# Patient Record
Sex: MALE | Race: AMERICAN INDIAN OR ALASKA NATIVE
[De-identification: names, ages, dates, MRNs, and addresses within clinical notes are randomized per-mention and may not be internally consistent; named-entity substitution may affect disease eponyms.]

---

## 2018-08-06 ENCOUNTER — HOSPITAL ENCOUNTER (EMERGENCY)
Dept: HOSPITAL 5 - ED | Age: 23
Discharge: HOME | End: 2018-08-06
Payer: SELF-PAY

## 2018-08-06 VITALS — SYSTOLIC BLOOD PRESSURE: 150 MMHG | DIASTOLIC BLOOD PRESSURE: 81 MMHG

## 2018-08-06 DIAGNOSIS — Z20.2: Primary | ICD-10-CM

## 2018-08-06 DIAGNOSIS — F12.10: ICD-10-CM

## 2018-08-06 PROCEDURE — 96372 THER/PROPH/DIAG INJ SC/IM: CPT

## 2018-08-06 PROCEDURE — 99282 EMERGENCY DEPT VISIT SF MDM: CPT

## 2018-08-06 NOTE — EMERGENCY DEPARTMENT REPORT
ED Male  HPI





- General


Chief complaint: Urogenital-Male


Stated complaint: MILD HEAD ACHE


Time Seen by Provider: 08/06/18 10:46


Source: patient


Mode of arrival: Ambulatory


Limitations: No Limitations





- History of Present Illness


Initial comments: 





This is a 23-year-old -American male that presents with dysuria and 

penile discharge for 3 days.  Patient states he is having painful urination 

with yellowish discharge.  Denies pelvic and low back pain, testicular pain or 

swelling, dysuria, frequency, and urgency.  Admits to recent exposure to STDs.  

Patient states he just started a new job and insurance has not started.


MD Complaint: penile discharge, dysuria


Onset/Timing: 3


-: days(s)


Location: penis


Radiation: none


Severity: moderate


Severity scale (0 -10): 2


Quality: burning


Consistency: intermittent


Improves with: none


Worsens with: urination


new sexual partner


discharge, dysuria.  denies: swelling, mass, rash, urinary retention, blood in 

urine, fever, nausea/vomiting, incontinence





- Related Data


Sexually active: Yes


 Allergies











Allergy/AdvReac Type Severity Reaction Status Date / Time


 


No Known Allergies Allergy   Unverified 08/06/18 10:23














ED Review of Systems


ROS: 


Stated complaint: MILD HEAD ACHE


Other details as noted in HPI





Constitutional: denies: chills, fever


Respiratory: denies: cough, shortness of breath, wheezing


Cardiovascular: denies: chest pain, palpitations


Gastrointestinal: denies: abdominal pain, nausea, vomiting, diarrhea


Genitourinary: dysuria, discharge (yellowish discharge).  denies: urgency, 

frequency, hematuria, testicular pain, testicular mass


Musculoskeletal: denies: back pain, joint swelling, arthralgia, myalgia


Neurological: denies: headache, weakness, paresthesias


Psychiatric: denies: anxiety, depression





ED Past Medical Hx





- Past Medical History


Previous Medical History?: No





- Surgical History


Past Surgical History?: No





- Social History


Smoking Status: Never Smoker


Substance Use Type: Marijuana





ED Physical Exam





- General


Limitations: No Limitations


General appearance: alert, in no apparent distress





- Respiratory


Respiratory exam: Present: normal lung sounds bilaterally.  Absent: respiratory 

distress





- Cardiovascular


Cardiovascular Exam: Present: regular rate, normal rhythm.  Absent: systolic 

murmur, diastolic murmur, rubs, gallop





- GI/Abdominal


GI/Abdominal exam: Present: soft, normal bowel sounds.  Absent: organomegaly, 

mass





- Back Exam


Back exam: Present: normal inspection, full ROM.  Absent: CVA tenderness (R), 

CVA tenderness (L), muscle spasm, paraspinal tenderness, vertebral tenderness, 

rash noted





- Neurological Exam


Neurological exam: Present: alert, oriented X3





- Psychiatric


Psychiatric exam: Present: normal affect, normal mood





ED Course





 Vital Signs











  08/06/18





  10:23


 


Temperature 98.7 F


 


Pulse Rate 61


 


Respiratory 16





Rate 


 


Blood Pressure 150/81


 


O2 Sat by Pulse 98





Oximetry 














ED Medical Decision Making





- Medical Decision Making





This is a 23-year-old -American male who presents with penile irritation 

and discharge for 3 days. Patient was examined by me.  Vitals are stable and in 

no acute distress.  No labs ordered.  Empirically treated with Rocephin 250 mg 

IM and azithromycin 1 g by mouth.  Discharged home in stable condition.  

Discussed prevention options. F/U with PCP or Health Department.


Critical care attestation.: 


If time is entered above; I have spent that time in minutes in the direct care 

of this critically ill patient, excluding procedure time.








ED Disposition


Clinical Impression: 


 Exposure to STD





Disposition: DC-01 TO HOME OR SELFCARE


Is pt being admited?: No


Does the pt Need Aspirin: No


Condition: Stable


Instructions:  Sexually Transmitted Diseases (ED), Safe Sex (ED)


Additional Instructions: 


Avoid drinking alcohol while taking antibiotics and for 24 hours after 

completion.


Continue safe sexual intercourse.


Follow up with Primary Care Provider or health department.


Referrals: 


[facility] [Outside] - 3-5 Days


[facility] [Outside] - 3-5 Days


[facility] [Outside] - 3-5 Days


Time of Disposition: 11:12


Print Language: ENGLISH